# Patient Record
Sex: FEMALE | URBAN - METROPOLITAN AREA
[De-identification: names, ages, dates, MRNs, and addresses within clinical notes are randomized per-mention and may not be internally consistent; named-entity substitution may affect disease eponyms.]

---

## 2023-07-25 ENCOUNTER — TELEPHONE (OUTPATIENT)
Dept: OPHTHALMOLOGY | Facility: CLINIC | Age: 48
End: 2023-07-25

## 2023-07-25 NOTE — TELEPHONE ENCOUNTER
No referral/notes in system    Will review with neuro-ophthalmology team when back in clinic tomorrow    Frank Ralph RN 5:37 PM 07/25/23

## 2023-07-25 NOTE — TELEPHONE ENCOUNTER
[facility] Call Center    Phone Message    May a detailed message be left on voicemail: yes     Reason for Call: Symptoms or Concerns     If patient has red-flag symptoms, warm transfer to triage line    Current symptom or concern: High intercranial pressure 42cm h20    Symptoms have been present for:  visual decrease 2 day(s)     Has patient previously been seen for this? No    By : n/a    Date: n/a    Are there any new or worsening symptoms? Yes: stiffness in neck and shoulders, dizzy spells    Action Taken: Message routed to:  [facility] ([facility] eye    Travel Screening: Not Applicable

## 2023-07-26 NOTE — TELEPHONE ENCOUNTER
[phone] mobile (only number in demographics    Left message with direct number at [phone]    Frank Ralph RN 1:14 PM 07/26/23